# Patient Record
Sex: MALE | Race: WHITE
[De-identification: names, ages, dates, MRNs, and addresses within clinical notes are randomized per-mention and may not be internally consistent; named-entity substitution may affect disease eponyms.]

---

## 2017-09-19 ENCOUNTER — HOSPITAL ENCOUNTER (OUTPATIENT)
Dept: HOSPITAL 80 - FSGY | Age: 72
Discharge: HOME | End: 2017-09-19
Attending: INTERNAL MEDICINE
Payer: COMMERCIAL

## 2017-09-19 VITALS — HEART RATE: 65 BPM

## 2017-09-19 VITALS — SYSTOLIC BLOOD PRESSURE: 110 MMHG | OXYGEN SATURATION: 98 % | DIASTOLIC BLOOD PRESSURE: 74 MMHG

## 2017-09-19 VITALS — TEMPERATURE: 97.5 F

## 2017-09-19 VITALS — RESPIRATION RATE: 14 BRPM

## 2017-09-19 DIAGNOSIS — C90.00: ICD-10-CM

## 2017-09-19 DIAGNOSIS — Z87.891: ICD-10-CM

## 2017-09-19 DIAGNOSIS — R91.8: Primary | ICD-10-CM

## 2017-09-19 PROCEDURE — 0BBB8ZX EXCISION OF LEFT LOWER LOBE BRONCHUS, VIA NATURAL OR ARTIFICIAL OPENING ENDOSCOPIC, DIAGNOSTIC: ICD-10-PCS | Performed by: INTERNAL MEDICINE

## 2017-09-19 NOTE — BVPULMO
[facility]

Surgical Services- Pulmonology

_____________________________________________________________________________________________________
_______

Patient Name: Dwayne Rizvi                           Procedure Date: 9/19/2017 1:57 PM

MRN: [id number]                                      Account Number: [id number]

Patient Type: Outpatient                             Attending MD/YESICA Physician: Alden Ely MD

_____________________________________________________________________________________________________
_______

 

Procedure:            Bronchoscopy

Indications:          Hemoptysis with abnormal CXR

Providers:            Alden Ely MD

Medicines:            Lidocaine 4% via nebulizer with Albuterol 2.5 mg

Complications:        There were no complications. Vital signs and oxygen saturations on supplemental
 

                      oxygen remained normal throughout the procedure.

                      No immediate complications

_____________________________________________________________________________________________________
_______

Procedure:            After informed consent, a time out was performed. N95 masks were worn, and the 


                      procedure was done in a negative pressure room. The patient was given appropria
te 

                      topical anesthesia and intravenous sedation. The fiberopic bronchoscope was pas
sed 

                      via a bite block orally into the larynx and subsequently into the lower trachea
 

                      bronchial tree. Throughout the procedure, the patient's blood pressure, pulse, 
and 

                      oxygen saturations were monitored continuously. The patient tolerated the proce
dure 

                      well.

Findings:

     The trachea was normal. The right side was normal. There were no significant secretions. On the 
left, 

     the a left upper lobe and lingula were normal. The left lower lobe was erythematous possibly wit
h 

     some abnormal tissue at the orifice of the superior segment. However, soon as the scope was adva
nced 

     there was significant bleeding/oozing that persisted throughout the procedure and made visualiza
tion 

     very difficult. Fluoroscopy was used. Bronchial washings were taken from this area followed by a
 

     bronchial brush sample for cytologies. Endobronchial and transbronchial biopsies were then taken
 from 

     the superior segment of the left lower lobe under fluoroscopic guidance. However, based on the 

     problems with visualization it was unclear if appropriate tissue was actually sampled.

     Left Lung Abnormalities:

     Bronchoalveolar lavage was performed in the LLL superior segment (B6) of the lung.

Post Op Diagnosis:    Significant friability and bleeding at the orifice to the left lower lobe. Abno
rmal 

                      tissue may or may not have been present.

Estimated Blood Loss: Estimated blood loss was perhaps 10-15 mL.

                      Estimated blood loss was minimal.

Recommendation:       - Await biopsy results.

Attending Participation:

     I personally performed the entire procedure.

 

Alden Ely MD

__________________

Alden Ely MD

9/19/2017 3:43:48 PM

Number of Addenda: 0

 

Note Initiated On: 9/19/2017 1:57 PM

[email or web address]

## 2017-09-19 NOTE — PDPROPOC
Sedation Plan of Care


Sedation Plan of Care: vital signs stable, mental status noted, patient 

educated of risks, benefits, alternatives, patient can tolerate sedation


ASA Classification: ASA 2


Planned drugs: fentanyl, midazolam


Mallampati Score: Class 2


Mallampati Reference Image: 





Patient passed 3-3-2 rule?: Yes

## 2018-09-21 ENCOUNTER — HOSPITAL ENCOUNTER (OUTPATIENT)
Dept: HOSPITAL 80 - FIMAGING | Age: 73
End: 2018-09-21
Attending: NURSE PRACTITIONER
Payer: COMMERCIAL

## 2018-09-21 DIAGNOSIS — R91.1: Primary | ICD-10-CM

## 2018-09-21 DIAGNOSIS — C90.01: ICD-10-CM

## 2018-09-21 PROCEDURE — 71275 CT ANGIOGRAPHY CHEST: CPT
